# Patient Record
Sex: FEMALE | Race: WHITE | NOT HISPANIC OR LATINO | URBAN - METROPOLITAN AREA
[De-identification: names, ages, dates, MRNs, and addresses within clinical notes are randomized per-mention and may not be internally consistent; named-entity substitution may affect disease eponyms.]

---

## 2018-01-01 ENCOUNTER — HOSPITAL ENCOUNTER (EMERGENCY)
Facility: MEDICAL CENTER | Age: 28
End: 2018-08-26
Attending: EMERGENCY MEDICINE
Payer: COMMERCIAL

## 2018-01-01 VITALS — HEIGHT: 66 IN | RESPIRATION RATE: 14 BRPM | BODY MASS INDEX: 32.14 KG/M2 | WEIGHT: 200 LBS

## 2018-01-01 DIAGNOSIS — K92.2 GASTROINTESTINAL HEMORRHAGE, UNSPECIFIED GASTROINTESTINAL HEMORRHAGE TYPE: ICD-10-CM

## 2018-01-01 DIAGNOSIS — I46.9 CARDIAC ARREST (HCC): ICD-10-CM

## 2018-01-01 PROCEDURE — 92950 HEART/LUNG RESUSCITATION CPR: CPT

## 2018-01-01 PROCEDURE — 700111 HCHG RX REV CODE 636 W/ 250 OVERRIDE (IP): Performed by: EMERGENCY MEDICINE

## 2018-01-01 PROCEDURE — 99285 EMERGENCY DEPT VISIT HI MDM: CPT

## 2018-01-01 PROCEDURE — 32554 ASPIRATE PLEURA W/O IMAGING: CPT

## 2018-01-01 RX ADMIN — EPINEPHRINE 1 MG: 0.1 INJECTION, SOLUTION ENDOTRACHEAL; INTRACARDIAC; INTRAVENOUS at 17:13

## 2018-01-01 RX ADMIN — SODIUM BICARBONATE 50 MEQ: 84 INJECTION, SOLUTION INTRAVENOUS at 17:09

## 2018-01-01 RX ADMIN — SODIUM BICARBONATE 50 MEQ: 84 INJECTION, SOLUTION INTRAVENOUS at 17:16

## 2018-01-01 RX ADMIN — EPINEPHRINE 1 MG: 0.1 INJECTION, SOLUTION ENDOTRACHEAL; INTRACARDIAC; INTRAVENOUS at 17:08

## 2018-08-27 NOTE — ED TRIAGE NOTES
Brought in by care [facility]. Transfer from [place]. [facility] pt lost pulses at 1625. CPR was initiated with ROSC and loss of pulses again before arrival to ED.

## 2018-08-27 NOTE — DISCHARGE PLANNING
"SW responded to cardiac arrest. Pt was en route from [facility] when she coded. Per EMS, pts father, Guanaco ([phone]) is on his way from [facility]. SW attempted several times with no success. SW was unable to leave a message. SW contacted pts other emergency contact, name unknown ([phone]), who confirmed Guanaco's number. Due to an inability to reach pts father, ERP updated pts sister of her passing. Per ERP, sister \"began to scream\" and hung up. SW called the number several times, leaving contact information and requesting a call back, but was unable to get a hold of family. SS updated ERP.   "

## 2018-08-27 NOTE — ED PROVIDER NOTES
"ED Provider Note    Scribed for Rosy Lakhani M.D. by Milagro Stephen. 8/26/2018  5:25 PM    Primary care provider: Danay Vallejo M.D.  Means of arrival: [facility]  History obtained from: EMS  History limited by: patient unresponsive    CHIEF COMPLAINT  Chief Complaint   Patient presents with   • Cardiac Arrest       HPI  Marcia Acosta is a 28 y.o. female who presents to the Emergency Department as a transfer from [place] via EMS for evaluation of altered mental status, GI bleed, meth use and sepsis. Patient was unresponsive and intubated with CPR in progress upon arrival. She initially presented with a massive GI bleed and began to go into cardiac arrest at 16:25 during her flight here on [facility]. Per EMS, CPR was performed in the field for 45 minutes. She has a history of amphetamine and heroin abuse. Patient had positive amphetamine use today. She had a central line placed prior to arrival. Her labs in [place] were significant for a high wbc count, renal failure and lactate of 15.     Further HPI limited due to patient being unresponsive upon arrival.    REVIEW OF SYSTEMS  ROS unattainable due to patient being unresponsive upon arrival. C    PAST MEDICAL HISTORY   GERD, migraines, bipolar disorder, asthma, post partum hemorrhage.     SURGICAL HISTORY  patient denies any surgical history    SOCIAL HISTORY  Amphetamine and heroin use.    FAMILY HISTORY  None noted.     CURRENT MEDICATIONS    Current Facility-Administered Medications:   •  EPINEPHrine PF (ADRENALIN) injection, , , ED ONCE PRN, Rosy Lakhani M.D., 1 mg at 08/26/18 1713  •  sodium bicarbonate 8.4 % injection, , Intravenous, ED ONCE PRN, Rosy Lakhani M.D., 50 mEq at 08/26/18 1716    ALLERGIES  Morphine    PHYSICAL EXAM  VITAL SIGNS: BP (!) 0/0   Pulse (!) 0   Resp 14 by respiratory Comment: via BVM  Ht 1.676 m (5' 6\")   Wt 90.7 kg (200 lb)   BMI 32.28 kg/m²     Constitutional: Intubated and unresponsive, CPR in progress  HEENT: " Normocephalic, Atraumatic, Coffee ground emesis on face.   Neck: Intubated, trachea midline, mild subcutaneous emphysema.  Cardiovascular: No pulses, no cardiac activity on ultrasound, no heart sounds  Thorax & Lungs: Intubated, rhonchorous bilaterally  Abdomen: No bowel sounds, Soft, non distended,   Skin: Pale and mottled   Neurologic: CPR in progress, unresponsive  Extremities: Cool, no pulses.    Further physical exam limited due to patient being unresponsive upon arrival.    DIAGNOSTIC STUDIES / PROCEDURES    LABS  From [place]:  WBC 31 with left shift  Lactic acid: 15  CO2: 6  Creatine: 5.78  Anemic hemoglobin: 10.5  AST: 802  ALT: 326  Initial pH: 6.51  Bicarbonate: 5  pCO2: 60    Urine: many bacteria, large blood, postive nitrite, cloudy  Alcohol: negative  Negative pregnancy  Amphetamine: positive  THC: positive  Lipase: 73  Coag: normal  Aspirin: normal  Troponin: 0.036, high    All labs reviewed by me.    EKG  12 lead EKG from [place]; Interpreted by emergency department physician    Rhythm: Normal Sinus Rhythm   Rate: 97  Axis: Normal  R Wave: Normal R wave progression  Normal ST-T segments  ST Segments: No ST segment elevation   T waves: Normal  Q waves: None    Clinical Impression: wide complex tachycardia with nonspecific intraventricular conduction delay    COURSE & MEDICAL DECISION MAKING  Nursing notes, VS, PMSFHx reviewed in chart.    5:00 PM Patient seen and examined at bedside. Patient unresponsive and intubated upon arrival. The differential diagnoses include but are not limited to: cardiac arrest, GI bleed, anemia, renal failure, overwhelming sepsis, tension pneumothorax.    5:08 PM 1 mg Epinephrine administered.    5:09 PM 50 mcg bicarbonate administered. No pulses at this time, asystole on monitor    5:12 PM No pulse at this time and patient is in asystole, CPR continued.    5:13 PM 1 mg Epinephrine administered.     5:14 PM Patient needled in her right chest secondary to right sided  pneumothorax. Patient had no improvement.     5:15 PM Patient is still in asystole.    5:16 PM Continued CPR. 50 mcg bicarbonate administered.     5:18 PM Patient had no improvement after interventions. Time of death 5:18.    5:40 PM Past medical records from [place] reviewed which reveal that the patient was initially complaining of right lower extremity numbness. Further history was limited due to patient's agitation for which she received 2 mg ativan. She had an EKG performed, was neurovascularly in tact, and ambulated in [place] ED without difficulty. She was unresponsive at 12:25pm which was unlikely related to ativan. Patient reported taking excess of her medications yesterday. She had a weak, thready pulse and began to vomit dark contents. Patient was treated with epinephrine for low blood pressure as well as antibiotics and iv fluids. She also had a rogers catheter placed. There was a tampon in the vaginal vault with a foul smell.She was intubated, had a right IJ central line placed and was transferred to [facility] for further care and coded during transport.     CRITICAL CARE  The very real possibilty of a deterioration of this patient's condition required the highest level of my preparedness for sudden, emergent intervention.  I provided critical care services, which included medication orders, frequent reevaluations of the patient's condition and response to treatment, ordering and reviewing test results, and discussing the case with various consultants.  The critical care time associated with the care of the patient was 40 minutes. Review chart for interventions. This time is exclusive of any other billable procedures.     DISPOSITION:      FINAL IMPRESSION  1. Cardiac arrest (HCC)    2. Gastrointestinal hemorrhage, unspecified gastrointestinal hemorrhage type       The critical care time associated with the care of the patient was 40 minutes. This time is exclusive of any other billable procedures.       Milagro PIMENTEL (Scribe), am scribing for, and in the presence of, Rosy Lakhani M.D..    Electronically signed by: Milagro Stephen (Scribe), 8/26/2018    Rosy PIMENTEL M.D. personally performed the services described in this documentation, as scribed by Milagro Stephen in my presence, and it is both accurate and complete.    The note accurately reflects work and decisions made by me.  Rosy Lakhani  8/26/2018  11:26 PM

## 2018-08-27 NOTE — ED NOTES
Pt arrived in asystole at 1705. Pulse checks every two minutes by ERP. Pt in asystole the entire time. Needle decompression at 1715 on right side of chest right midclavicular second intercostal space. Time of death 1718 called by Dr. Lakhani